# Patient Record
Sex: FEMALE | Race: WHITE | Employment: UNEMPLOYED
[De-identification: names, ages, dates, MRNs, and addresses within clinical notes are randomized per-mention and may not be internally consistent; named-entity substitution may affect disease eponyms.]

---

## 2017-11-26 ENCOUNTER — HEALTH MAINTENANCE LETTER (OUTPATIENT)
Age: 32
End: 2017-11-26

## 2018-08-03 ENCOUNTER — HOSPITAL ENCOUNTER (EMERGENCY)
Facility: HOSPITAL | Age: 33
Discharge: HOME | End: 2018-08-04
Attending: FAMILY MEDICINE | Admitting: FAMILY MEDICINE
Payer: MEDICARE

## 2018-08-03 DIAGNOSIS — F31.12 BIPOLAR AFFECTIVE DISORDER, CURRENTLY MANIC, MODERATE (H): ICD-10-CM

## 2018-08-03 DIAGNOSIS — R45.851 SUICIDAL IDEATION: ICD-10-CM

## 2018-08-03 DIAGNOSIS — F10.229 ACUTE ALCOHOLIC INTOXICATION IN ALCOHOLISM WITH COMPLICATION (H): ICD-10-CM

## 2018-08-03 DIAGNOSIS — F41.9 ANXIETY: ICD-10-CM

## 2018-08-03 LAB
BASOPHILS # BLD AUTO: 0.1 10E9/L (ref 0–0.2)
BASOPHILS NFR BLD AUTO: 0.4 %
DIFFERENTIAL METHOD BLD: ABNORMAL
EOSINOPHIL # BLD AUTO: 0.2 10E9/L (ref 0–0.7)
EOSINOPHIL NFR BLD AUTO: 1.4 %
ERYTHROCYTE [DISTWIDTH] IN BLOOD BY AUTOMATED COUNT: 13.3 % (ref 10–15)
HCT VFR BLD AUTO: 39.4 % (ref 35–47)
HGB BLD-MCNC: 14.1 G/DL (ref 11.7–15.7)
IMM GRANULOCYTES # BLD: 0 10E9/L (ref 0–0.4)
IMM GRANULOCYTES NFR BLD: 0.3 %
LYMPHOCYTES # BLD AUTO: 5.5 10E9/L (ref 0.8–5.3)
LYMPHOCYTES NFR BLD AUTO: 45.6 %
MCH RBC QN AUTO: 32.6 PG (ref 26.5–33)
MCHC RBC AUTO-ENTMCNC: 35.8 G/DL (ref 31.5–36.5)
MCV RBC AUTO: 91 FL (ref 78–100)
MONOCYTES # BLD AUTO: 0.6 10E9/L (ref 0–1.3)
MONOCYTES NFR BLD AUTO: 4.7 %
NEUTROPHILS # BLD AUTO: 5.7 10E9/L (ref 1.6–8.3)
NEUTROPHILS NFR BLD AUTO: 47.6 %
NRBC # BLD AUTO: 0 10*3/UL
NRBC BLD AUTO-RTO: 0 /100
PLATELET # BLD AUTO: 316 10E9/L (ref 150–450)
RBC # BLD AUTO: 4.32 10E12/L (ref 3.8–5.2)
WBC # BLD AUTO: 12 10E9/L (ref 4–11)

## 2018-08-03 PROCEDURE — 99285 EMERGENCY DEPT VISIT HI MDM: CPT | Mod: 25

## 2018-08-03 PROCEDURE — 25000132 ZZH RX MED GY IP 250 OP 250 PS 637: Mod: GY

## 2018-08-03 PROCEDURE — 25000128 H RX IP 250 OP 636: Performed by: FAMILY MEDICINE

## 2018-08-03 PROCEDURE — 36415 COLL VENOUS BLD VENIPUNCTURE: CPT | Performed by: FAMILY MEDICINE

## 2018-08-03 PROCEDURE — A9270 NON-COVERED ITEM OR SERVICE: HCPCS | Mod: GY

## 2018-08-03 PROCEDURE — 80329 ANALGESICS NON-OPIOID 1 OR 2: CPT | Performed by: FAMILY MEDICINE

## 2018-08-03 PROCEDURE — 80320 DRUG SCREEN QUANTALCOHOLS: CPT | Performed by: FAMILY MEDICINE

## 2018-08-03 PROCEDURE — 80053 COMPREHEN METABOLIC PANEL: CPT | Performed by: FAMILY MEDICINE

## 2018-08-03 PROCEDURE — 80307 DRUG TEST PRSMV CHEM ANLYZR: CPT | Performed by: FAMILY MEDICINE

## 2018-08-03 PROCEDURE — 99284 EMERGENCY DEPT VISIT MOD MDM: CPT | Performed by: FAMILY MEDICINE

## 2018-08-03 PROCEDURE — 85025 COMPLETE CBC W/AUTO DIFF WBC: CPT | Performed by: FAMILY MEDICINE

## 2018-08-03 PROCEDURE — 84443 ASSAY THYROID STIM HORMONE: CPT | Performed by: FAMILY MEDICINE

## 2018-08-03 RX ORDER — OLANZAPINE 5 MG/1
TABLET, ORALLY DISINTEGRATING ORAL
Status: COMPLETED
Start: 2018-08-03 | End: 2018-08-03

## 2018-08-03 RX ORDER — NICOTINE 21 MG/24HR
PATCH, TRANSDERMAL 24 HOURS TRANSDERMAL
Status: COMPLETED
Start: 2018-08-03 | End: 2018-08-03

## 2018-08-03 RX ORDER — NICOTINE 21 MG/24HR
1 PATCH, TRANSDERMAL 24 HOURS TRANSDERMAL DAILY
Status: DISCONTINUED | OUTPATIENT
Start: 2018-08-04 | End: 2018-08-04 | Stop reason: HOSPADM

## 2018-08-03 RX ORDER — OLANZAPINE 5 MG/1
5 TABLET ORAL ONCE
Status: DISCONTINUED | OUTPATIENT
Start: 2018-08-03 | End: 2018-08-04 | Stop reason: HOSPADM

## 2018-08-03 RX ADMIN — NICOTINE 1 PATCH: 14 PATCH, EXTENDED RELEASE TRANSDERMAL at 23:32

## 2018-08-03 RX ADMIN — Medication 1 PATCH: at 23:32

## 2018-08-03 RX ADMIN — OLANZAPINE 5 MG: 5 TABLET, ORALLY DISINTEGRATING ORAL at 23:32

## 2018-08-03 RX ADMIN — SODIUM CHLORIDE 1000 ML: 9 INJECTION, SOLUTION INTRAVENOUS at 23:45

## 2018-08-03 NOTE — ED AVS SNAPSHOT
[facility]    [address]    [facility] [address]    Phone:  [phone]                                       Yaritza Mayberry   MRN: [id number]    Department:  [facility]   Date of Visit:  8/3/2018           Patient Information     Date Of Birth          1985        Your diagnoses for this visit were:     Suicidal ideation     Acute alcoholic intoxication in alcoholism with complication (H)     Anxiety     Bipolar affective disorder, currently manic, moderate (H)        You were seen by Reba Green MD and Wanda Ruby MD.      Follow-up Information     Follow up with Tessa Tobias MD In 3 days.    Specialty:  Family Practice    Why:  Follow up ED visit    Contact information:    [address] [facility] [address]  [phone]        Discharge References/Attachments     ALCOHOL INTOXICATION (ENGLISH)         Review of your medicines      CONTINUE these medicines which may have CHANGED, or have new prescriptions. If we are uncertain of the size of tablets/capsules you have at home, strength may be listed as something that might have changed.        Dose / Directions Last dose taken    OXcarbazepine 300 MG tablet   Commonly known as:  TRILEPTAL   Dose:  450 mg   What changed:    - how much to take  - Another medication with the same name was removed. Continue taking this medication, and follow the directions you see here.   Quantity:  90 tablet        Take 1.5 tablets (450 mg) by mouth 2 times daily   Refills:  5          Our records show that you are taking the medicines listed below. If these are incorrect, please call your family doctor or clinic.        Dose / Directions Last dose taken    buPROPion 150 MG 12 hr tablet   Commonly known as:  WELLBUTRIN SR   Dose:  150 mg   Quantity:  90 tablet        Take 1 tablet (150 mg) by mouth daily   Refills:  1        diphenhydrAMINE 50 MG capsule   Commonly known as:  BENADRYL   Quantity:   60 capsule        Take 1 capsule daily as needed   Refills:  0        gabapentin 300 MG capsule   Commonly known as:  NEURONTIN   Dose:  300 mg   Quantity:  45 capsule        Take 1 capsule (300 mg) by mouth 3 times daily   Refills:  0        MELATONIN   Dose:  3 mg        3 mg.   Refills:  0        Melatonin 10 MG Tabs tablet        Take 18 mg daily   Refills:  0        minocycline 100 MG capsule   Commonly known as:  MINOCIN/DYNACIN   Dose:  100 mg   Quantity:  180 capsule        Take 1 capsule (100 mg) by mouth 2 times daily   Refills:  1        prazosin 1 MG capsule   Commonly known as:  MINIPRESS   Quantity:  90 capsule        Take three capsules by mouth every night   Refills:  1        PRENATAL AD Tabs   Dose:  1 tablet   Quantity:  100 tablet        Take 1 tablet by mouth daily   Refills:  1                Prescriptions were sent or printed at these locations (2 Prescriptions)                   [facility] [place] - [address]   [address] [address]    Telephone:  [phone]   Fax:  [phone]   Hours:                  E-Prescribed (2 of 2)         buPROPion (WELLBUTRIN SR) 150 MG 12 hr tablet               OXcarbazepine (TRILEPTAL) 300 MG tablet                Procedures and tests performed during your visit     Acetaminophen level    Alcohol ethyl    CBC with platelets differential    Comprehensive metabolic panel    DEC Assess    Restraints Violent or Self-Destructive Adult (Age 18 and Older)    Salicylate level    TSH      Orders Needing Specimen Collection     Ordered          08/03/18 2312  Drug Screen Urine ([facility]) - ROUTINE, Prio: Routine, Status: Sent     Scheduled Task Status   08/03/18 2312 [employer] CC Reminder: Open   Order Class:  PCU Collect                08/03/18 2312  UA with Microscopic reflex to Culture - STAT, Prio: STAT, Status: Sent     Scheduled Task Status   08/03/18 2312 [employer] CC Reminder: Open   Order Class:  PCU Collect                 "18 231  HCG qualitative urine - STAT, Prio: STAT, Status: Sent     Scheduled Task Status   18 [employer] CC Reminder: Open   Order Class:  PCU Collect                  Pending Results     No orders found for last 3 day(s).            Pending Culture Results     No orders found for last 3 day(s).            Thank you for choosing [facility]       Thank you for choosing [facility] for your care. Our goal is always to provide you with excellent care. Hearing back from our patients is one way we can continue to improve our services. Please take a few minutes to complete the written survey that you may receive in the mail after you visit with us. Thank you!        [employer] Information     [employer] lets you send messages to your doctor, view your test results, renew your prescriptions, schedule appointments and more. To sign up, go to [email or web address] . Click on \"Log in\" on the left side of the screen, which will take you to the Welcome page. Then click on \"Sign up Now\" on the right side of the page.     You will be asked to enter the access code listed below, as well as some personal information. Please follow the directions to create your username and password.     Your access code is: [email or web address]  Expires: 2018 10:40 AM     Your access code will  in 90 days. If you need help or a new code, please call your [facility] clinic or [phone].        Care EveryWhere ID     This is your Care EveryWhere ID. This could be used by other organizations to access your [facility] medical records  [id number]        Equal Access to Services     [facility] : Hadii marques chacnoo Sobrookeali, waaxda luqadaha, qaybta kaalmada adejoslynyajaneth, josh buckley . So [facility] [phone].    ATENCIÓN: Si habla español, tiene a brand disposición servicios gratuitos de asistencia lingüística. Llame al [phone].    We comply with applicable federal civil rights laws and [place] laws. We do not " discriminate on the basis of race, color, national origin, age, disability, sex, sexual orientation, or gender identity.            After Visit Summary       This is your record. Keep this with you and show to your community pharmacist(s) and doctor(s) at your next visit.

## 2018-08-03 NOTE — ED AVS SNAPSHOT
[facility]    [address] [place] [address]    Phone:  [phone]                                       Yaritza Mayberry   MRN: [id number]    Department:  [facility]   Date of Visit:  8/3/2018           After Visit Summary Signature Page     I have received my discharge instructions, and my questions have been answered. I have discussed any challenges I see with this plan with the nurse or doctor.    ..........................................................................................................................................  Patient/Patient Representative Signature      ..........................................................................................................................................  Patient Representative Print Name and Relationship to Patient    ..................................................               ................................................  Date                                            Time    ..........................................................................................................................................  Reviewed by Signature/Title    ...................................................              ..............................................  Date                                                            Time

## 2018-08-04 VITALS
DIASTOLIC BLOOD PRESSURE: 89 MMHG | TEMPERATURE: 98.5 F | HEART RATE: 53 BPM | SYSTOLIC BLOOD PRESSURE: 155 MMHG | RESPIRATION RATE: 16 BRPM | OXYGEN SATURATION: 100 %

## 2018-08-04 LAB
ALBUMIN SERPL-MCNC: 4.3 G/DL (ref 3.4–5)
ALP SERPL-CCNC: 116 U/L (ref 40–150)
ALT SERPL W P-5'-P-CCNC: 48 U/L (ref 0–50)
ANION GAP SERPL CALCULATED.3IONS-SCNC: 11 MMOL/L (ref 3–14)
APAP SERPL-MCNC: <2 MG/L (ref 10–20)
AST SERPL W P-5'-P-CCNC: 32 U/L (ref 0–45)
BILIRUB SERPL-MCNC: 0.1 MG/DL (ref 0.2–1.3)
BUN SERPL-MCNC: 10 MG/DL (ref 7–30)
CALCIUM SERPL-MCNC: 8.3 MG/DL (ref 8.5–10.1)
CHLORIDE SERPL-SCNC: 109 MMOL/L (ref 94–109)
CO2 SERPL-SCNC: 24 MMOL/L (ref 20–32)
CREAT SERPL-MCNC: 0.57 MG/DL (ref 0.52–1.04)
ETHANOL SERPL-MCNC: 0.22 G/DL
GFR SERPL CREATININE-BSD FRML MDRD: >90 ML/MIN/1.7M2
GLUCOSE SERPL-MCNC: 94 MG/DL (ref 70–99)
POTASSIUM SERPL-SCNC: 3.9 MMOL/L (ref 3.4–5.3)
PROT SERPL-MCNC: 8.1 G/DL (ref 6.8–8.8)
SALICYLATES SERPL-MCNC: 4 MG/DL
SODIUM SERPL-SCNC: 144 MMOL/L (ref 133–144)
TSH SERPL DL<=0.005 MIU/L-ACNC: 1.06 MU/L (ref 0.4–4)

## 2018-08-04 PROCEDURE — 25000128 H RX IP 250 OP 636

## 2018-08-04 PROCEDURE — 96374 THER/PROPH/DIAG INJ IV PUSH: CPT

## 2018-08-04 PROCEDURE — 96375 TX/PRO/DX INJ NEW DRUG ADDON: CPT

## 2018-08-04 PROCEDURE — 96372 THER/PROPH/DIAG INJ SC/IM: CPT | Mod: XS

## 2018-08-04 PROCEDURE — 25000128 H RX IP 250 OP 636: Performed by: FAMILY MEDICINE

## 2018-08-04 PROCEDURE — 96361 HYDRATE IV INFUSION ADD-ON: CPT

## 2018-08-04 PROCEDURE — 25000125 ZZHC RX 250: Performed by: FAMILY MEDICINE

## 2018-08-04 RX ORDER — OXCARBAZEPINE 300 MG/1
450 TABLET, FILM COATED ORAL 2 TIMES DAILY
Qty: 90 TABLET | Refills: 5 | Status: SHIPPED | OUTPATIENT
Start: 2018-08-04

## 2018-08-04 RX ORDER — LORAZEPAM 2 MG/ML
INJECTION INTRAMUSCULAR
Status: COMPLETED
Start: 2018-08-04 | End: 2018-08-04

## 2018-08-04 RX ORDER — OLANZAPINE 10 MG/2ML
5 INJECTION, POWDER, FOR SOLUTION INTRAMUSCULAR ONCE
Status: DISCONTINUED | OUTPATIENT
Start: 2018-08-04 | End: 2018-08-04 | Stop reason: HOSPADM

## 2018-08-04 RX ORDER — KETAMINE HYDROCHLORIDE 10 MG/ML
10 INJECTION, SOLUTION INTRAMUSCULAR; INTRAVENOUS ONCE
Status: DISCONTINUED | OUTPATIENT
Start: 2018-08-04 | End: 2018-08-04

## 2018-08-04 RX ORDER — SODIUM CHLORIDE 9 MG/ML
INJECTION, SOLUTION INTRAVENOUS CONTINUOUS
Status: DISCONTINUED | OUTPATIENT
Start: 2018-08-04 | End: 2018-08-04 | Stop reason: HOSPADM

## 2018-08-04 RX ORDER — OLANZAPINE 10 MG/2ML
10 INJECTION, POWDER, FOR SOLUTION INTRAMUSCULAR ONCE
Status: COMPLETED | OUTPATIENT
Start: 2018-08-04 | End: 2018-08-04

## 2018-08-04 RX ORDER — DIPHENHYDRAMINE HYDROCHLORIDE 50 MG/ML
50 INJECTION INTRAMUSCULAR; INTRAVENOUS ONCE
Status: COMPLETED | OUTPATIENT
Start: 2018-08-04 | End: 2018-08-04

## 2018-08-04 RX ORDER — LORAZEPAM 2 MG/ML
2 INJECTION INTRAMUSCULAR ONCE
Status: COMPLETED | OUTPATIENT
Start: 2018-08-04 | End: 2018-08-04

## 2018-08-04 RX ORDER — KETAMINE HYDROCHLORIDE 10 MG/ML
5 INJECTION, SOLUTION INTRAMUSCULAR; INTRAVENOUS ONCE
Status: DISCONTINUED | OUTPATIENT
Start: 2018-08-04 | End: 2018-08-04

## 2018-08-04 RX ORDER — BUPROPION HYDROCHLORIDE 150 MG/1
150 TABLET, EXTENDED RELEASE ORAL DAILY
Qty: 90 TABLET | Refills: 1 | Status: SHIPPED | OUTPATIENT
Start: 2018-08-04

## 2018-08-04 RX ADMIN — SODIUM CHLORIDE: 9 INJECTION, SOLUTION INTRAVENOUS at 01:25

## 2018-08-04 RX ADMIN — LORAZEPAM 2 MG: 2 INJECTION INTRAMUSCULAR at 00:13

## 2018-08-04 RX ADMIN — DIPHENHYDRAMINE HYDROCHLORIDE 50 MG: 50 INJECTION, SOLUTION INTRAMUSCULAR; INTRAVENOUS at 00:13

## 2018-08-04 RX ADMIN — OLANZAPINE 10 MG: 10 INJECTION, POWDER, LYOPHILIZED, FOR SOLUTION INTRAMUSCULAR at 00:19

## 2018-08-04 RX ADMIN — LORAZEPAM 2 MG: 2 INJECTION INTRAMUSCULAR; INTRAVENOUS at 00:13

## 2018-08-04 ASSESSMENT — ENCOUNTER SYMPTOMS
DECREASED CONCENTRATION: 1
HYPERACTIVE: 1
AGITATION: 1
NERVOUS/ANXIOUS: 1

## 2018-08-04 NOTE — ED NOTES
Pt changed back into clothes.  She will wait in room 8 until her mother arrives to transport home.

## 2018-08-04 NOTE — ED NOTES
Right ankle, Right wrist, Left ankle and Left wrist, chest strap restraints initiated on patient on 8/3/18 at 0023         ,     Order received: Yes         Criteria explained to Patient and mother     Restraint care Plan initiated: Yes    Natalee Joaquin

## 2018-08-04 NOTE — ED PROVIDER NOTES
"Patient is awake and cooperative.  She has no insight as to her drinking, states \"ok, fine\" when advised she needs alcohol treatment.  No detox beds available.  DEC advised she should be safe going home, that suicidal ideation is not present now, and that alcohol is the thing that triggered her behavior.  Mother concurs.  Spoke with [facility].  Her medication (Trileptal and Wellbutrin) can be obtained for less than $50.  Spoke with patient about this and she stated she could deal with that and will pick them up.  She has been off meds since January, was doing much better when on them.    A: Bipolar with manic episode, psychotic features triggered by alcohol ingestion.      P: Discharge home with psychiatric medications, advise alcohol treatment and counseling.  Mother will pick her up.           Wanda Ruby MD  08/04/18 1036    "

## 2018-08-04 NOTE — ED NOTES
Face to face report given with opportunity to observe patient.    Report given to RENETTA Deshpande   8/4/2018  7:12 AM

## 2018-08-04 NOTE — ED NOTES
MD and DEC  have discussed plan.  MD called pt's pharmacy to check on cost of medications. Pt's medications cost $50/month.  Pt had reported she did not take her meds because they cost her $300/month.   DEC is working on setting up outpatient mental health care appt.  ED staff looking into possible Detox treatment for pt.  Pt is resting in bed, asleep with head of bed raised.

## 2018-08-04 NOTE — ED TRIAGE NOTES
"Pt is very emotional, stating to this nurse, \"I just want to die\". As soon as this nurse started asking Triage questions, pt tried to leave. Pt presents with her mother and mother and nurse escorted pt to Room 8 with   Security also accompanying. Pt shaking and constantly moving, pacing, stating she wants to leave. MD notified and went to room to speak with pt. Mother states pt is bipolar and has not been taking her meds. Pt states she has been off her meds since January. Pt repeatedly states, \"I'm not ok\" and \"I need help with my meds.  "

## 2018-08-04 NOTE — ED NOTES
Pt has continued to escalate her behavior, yelling, stating she is getting out of here, wants to go out to smoke. Pt had complained of hunger and was given food, pt had complained of thirst and has been given many glasses of ice water which she gulped down. Pt has been constantly moving in her room, pacing and demanding to be let go. Pt has been informed numerous times that she is on a hold r/t her stated desire to die. Pt's mother has remained with her and tries to reason with her and calm her. Pt's attention span is extremely short and goes from cooperating to refusing to cooperate mercurically. After explaining the ordered medication to help pt calm down, pt states she refuses and wants her IV taken out. Threatening to remove it herself. Pt instructed regarding the consequences of her aggressive behavior toward staff. Code 21 called when pt was excalating to the point of being out of control. Hands on pt was called for by this RN in order to administer Zyprexa IM as ordered by MD.

## 2018-08-04 NOTE — ED NOTES
Explained that medications will be ready at [facility] and will cost between $40-$50.

## 2018-08-04 NOTE — ED NOTES
Pt signed safety contract from DEC with exception of refusal to call [facility].  Pt states she connected with a therapist she likes and then they leave the organization.  She states this has happened several times and she is done with them.  Provided information and phone number for [facility] instead.  MD aware.  Pt agreed to call 911 or crisis line if feeling SI.  Pt also states once she starts her meds again she will not drink anymore.
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
## 2018-08-04 NOTE — ED NOTES
Right ankle, Right wrist, Left ankle and Left wrist, chest strap restraints initiated on patient on 8/4/18 at 0023           ,     Order received: Yes         Criteria explained to Patient and mother      Restraint care Plan initiated: Yes     Natalee Joaquin

## 2018-08-04 NOTE — ED NOTES
Pt very agitated, pacing, vacillating between cooperative and refusing care. Agrees to oral zyprexa and IV for fluids.

## 2018-08-04 NOTE — ED NOTES
Spoke with mother, Noemi.  She will  patient.  She will be driving here from [place] to pick her up.

## 2018-08-04 NOTE — ED NOTES
Violent/Self-Destructive Seclusion or Restraint Discontinuation Note    Type of seclusion or restraint: 5 point restraint  Patient's response to intervention: Initially angry, combative, irrational, unwilling to listen to redirection, now calm and agrees to cooperative  Date of discontinuation: 8/4/2018  Time of discontinuation: 0105   Face to face assessment completed: Yes.  Restraint monitoring minimum of every 15 minutes yes  Debriefing with patient completed: Yes.  Care plan updated:NA

## 2018-11-27 ENCOUNTER — HEALTH MAINTENANCE LETTER (OUTPATIENT)
Age: 33
End: 2018-11-27

## 2021-09-15 ENCOUNTER — HOSPITAL ENCOUNTER (EMERGENCY)
Facility: HOSPITAL | Age: 36
Discharge: HOME | End: 2021-09-15
Attending: NURSE PRACTITIONER | Admitting: NURSE PRACTITIONER

## 2021-09-15 VITALS
DIASTOLIC BLOOD PRESSURE: 95 MMHG | TEMPERATURE: 99.5 F | HEART RATE: 82 BPM | RESPIRATION RATE: 16 BRPM | SYSTOLIC BLOOD PRESSURE: 153 MMHG | OXYGEN SATURATION: 97 %

## 2021-09-15 DIAGNOSIS — J01.00 SINUSITIS, ACUTE MAXILLARY: ICD-10-CM

## 2021-09-15 DIAGNOSIS — J01.00 ACUTE MAXILLARY SINUSITIS, RECURRENCE NOT SPECIFIED: ICD-10-CM

## 2021-09-15 PROCEDURE — G0463 HOSPITAL OUTPT CLINIC VISIT: HCPCS

## 2021-09-15 PROCEDURE — 99213 OFFICE O/P EST LOW 20 MIN: CPT | Performed by: NURSE PRACTITIONER

## 2021-09-15 RX ORDER — DEXTROAMPHETAMINE SACCHARATE, AMPHETAMINE ASPARTATE MONOHYDRATE, DEXTROAMPHETAMINE SULFATE AND AMPHETAMINE SULFATE 5; 5; 5; 5 MG/1; MG/1; MG/1; MG/1
20 CAPSULE, EXTENDED RELEASE ORAL DAILY
COMMUNITY

## 2021-09-15 RX ORDER — DEXTROAMPHETAMINE SACCHARATE, AMPHETAMINE ASPARTATE, DEXTROAMPHETAMINE SULFATE AND AMPHETAMINE SULFATE 3.75; 3.75; 3.75; 3.75 MG/1; MG/1; MG/1; MG/1
15 TABLET ORAL 3 TIMES DAILY
COMMUNITY

## 2021-09-15 ASSESSMENT — ENCOUNTER SYMPTOMS
ACTIVITY CHANGE: 1
FEVER: 1
CHILLS: 0
VOMITING: 0
PHOTOPHOBIA: 1
SHORTNESS OF BREATH: 0
HEADACHES: 1
LIGHT-HEADEDNESS: 0
DIZZINESS: 0
SINUS PAIN: 1
NAUSEA: 0
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
## 2021-09-15 NOTE — ED TRIAGE NOTES
"Pt stated 2 days ago she had a \"gushing\" nose bleed. Since then she has had a headache with pain around left side of face/eye area.  "

## 2021-09-15 NOTE — DISCHARGE INSTRUCTIONS
Return to ER for worsening of symptoms     -Increase fluids.   -Complete all antibiotics even if feeling better.    -Taking antibiotics with food may decrease the symptoms, of an upset stomach, that can occur when taking antibiotics. Antibiotics frequently cause diarrhea. Probiotics or yogurt may help prevent or decrease these symptoms.   -Return to be reevaluated if symptoms do not improve, or worsen.